# Patient Record
Sex: MALE | Race: BLACK OR AFRICAN AMERICAN | NOT HISPANIC OR LATINO | RURAL
[De-identification: names, ages, dates, MRNs, and addresses within clinical notes are randomized per-mention and may not be internally consistent; named-entity substitution may affect disease eponyms.]

---

## 2023-06-09 DIAGNOSIS — I63.9 CEREBRAL INFARCTION: Primary | ICD-10-CM

## 2023-08-11 ENCOUNTER — OFFICE VISIT (OUTPATIENT)
Dept: NEUROLOGY | Facility: CLINIC | Age: 68
End: 2023-08-11
Payer: MEDICARE

## 2023-08-11 VITALS
HEIGHT: 69 IN | DIASTOLIC BLOOD PRESSURE: 70 MMHG | HEART RATE: 71 BPM | WEIGHT: 170 LBS | OXYGEN SATURATION: 98 % | BODY MASS INDEX: 25.18 KG/M2 | RESPIRATION RATE: 18 BRPM | SYSTOLIC BLOOD PRESSURE: 116 MMHG

## 2023-08-11 DIAGNOSIS — G45.9 TIA (TRANSIENT ISCHEMIC ATTACK): Primary | ICD-10-CM

## 2023-08-11 DIAGNOSIS — I63.9 CEREBRAL INFARCTION: ICD-10-CM

## 2023-08-11 PROCEDURE — 99204 PR OFFICE/OUTPT VISIT, NEW, LEVL IV, 45-59 MIN: ICD-10-PCS | Mod: S$PBB,,, | Performed by: PSYCHIATRY & NEUROLOGY

## 2023-08-11 PROCEDURE — 99215 OFFICE O/P EST HI 40 MIN: CPT | Mod: PBBFAC | Performed by: PSYCHIATRY & NEUROLOGY

## 2023-08-11 PROCEDURE — 99204 OFFICE O/P NEW MOD 45 MIN: CPT | Mod: S$PBB,,, | Performed by: PSYCHIATRY & NEUROLOGY

## 2023-08-11 NOTE — PROGRESS NOTES
"    Subjective:       Patient ID: Kirk Smith is a 68 y.o. male     Chief Complaint:    Chief Complaint   Patient presents with    Stroke     Pt states he had a mini stroke        Allergies:  Patient has no known allergies.    Current Medications:    Outpatient Encounter Medications as of 8/11/2023   Medication Sig Dispense Refill    acetaminophen-codeine 300-30mg (TYLENOL #3) 300-30 mg Tab Take 1 tablet by mouth every 12 (twelve) hours as needed.      aspirin (ECOTRIN) 325 MG EC tablet TAKE 1 TABLET BY MOUTH EVERY DAY AFTER MEALS      clopidogreL (PLAVIX) 75 mg tablet Take by mouth.      rosuvastatin (CRESTOR) 20 MG tablet Take 20 mg by mouth every evening.      valsartan (DIOVAN) 160 MG tablet Take 160 mg by mouth every evening.       No facility-administered encounter medications on file as of 8/11/2023.       History of Present Illness  68-year-old black male referred to clinic for evaluation possible previous stroke/TRANSIENT ISCHEMIC ATTACK -I have no outside records available for review and no imaging or MRI to verify an acute cerebral infarct.  Patient is currently on aspirin Plavix and statin which is proper prevention for stroke.  Patient states he was told at [facility] that he had a "mini-stroke" I am assuming this meant a transient ischemic attack?   Pt states had symptoms 3 months ago of RLE weakness and dragging - he got no PT/Rehab after d/c from [facility] - unsure why not?  He is now rel resolved but gets LE weakness   Pt has HTN, XOL, still smoking tobacco - unsure if seeing a PCP ? [facility]ely [facility]          Past Medical History:   Diagnosis Date    Stroke        History reviewed. No pertinent surgical history.    Social History  Mr. Smith  reports that he has been smoking cigarettes. He has never used smokeless tobacco. He reports that he does not currently use alcohol. He reports that he does not use drugs.    Family History  Mr.'s Smith family history is not on file.    Review of " "Systems  Review of Systems   Neurological:  Positive for focal weakness.   All other systems reviewed and are negative.     Objective:   /70 (BP Location: Left arm, Patient Position: Sitting, BP Method: Large (Manual))   Pulse 71   Resp 18   Ht 5' 9" (1.753 m)   Wt 77.1 kg (170 lb)   SpO2 98%   BMI 25.10 kg/m²    NEUROLOGICAL EXAMINATION:     MENTAL STATUS   Oriented to person, place, and time.   Level of consciousness: alert  Knowledge: consistent with education.     CRANIAL NERVES   Cranial nerves II through XII intact.     MOTOR EXAM     Strength   Strength 5/5 throughout.        Functional weakness in LE's     GAIT AND COORDINATION     Gait  Gait: normal       Physical Exam  Vitals reviewed.   Constitutional:       Appearance: He is normal weight.   Neurological:      General: No focal deficit present.      Mental Status: He is alert and oriented to person, place, and time. Mental status is at baseline.      Cranial Nerves: Cranial nerves 2-12 are intact.      Motor: Motor strength is normal.     Gait: Gait is intact.          Assessment:     TIA (transient ischemic attack)         Primary Diagnosis and ICD10  TIA (transient ischemic attack) [G45.9]    Plan:     Patient Instructions   Continue aspirin Plavix and statin  Control all risk factors for stroke and MI  Healthy lifestyle habits  STOP smoking tobacco   Referral PT/Rehab - s/p stroke reconditioning  Follow-up 6 months     There are no discontinued medications.    Requested Prescriptions      No prescriptions requested or ordered in this encounter       "

## 2023-08-11 NOTE — PATIENT INSTRUCTIONS
Continue aspirin Plavix and statin  Control all risk factors for stroke and MI  Healthy lifestyle habits  STOP smoking tobacco   Referral PT/Rehab - s/p stroke reconditioning  Follow-up 6 months

## 2024-02-16 ENCOUNTER — OFFICE VISIT (OUTPATIENT)
Dept: NEUROLOGY | Facility: CLINIC | Age: 69
End: 2024-02-16
Payer: COMMERCIAL

## 2024-02-16 VITALS
WEIGHT: 170 LBS | OXYGEN SATURATION: 98 % | BODY MASS INDEX: 25.18 KG/M2 | RESPIRATION RATE: 18 BRPM | DIASTOLIC BLOOD PRESSURE: 98 MMHG | HEART RATE: 67 BPM | HEIGHT: 69 IN | SYSTOLIC BLOOD PRESSURE: 190 MMHG

## 2024-02-16 DIAGNOSIS — G45.9 TIA (TRANSIENT ISCHEMIC ATTACK): Primary | ICD-10-CM

## 2024-02-16 PROCEDURE — 99214 OFFICE O/P EST MOD 30 MIN: CPT | Mod: PBBFAC | Performed by: PSYCHIATRY & NEUROLOGY

## 2024-02-16 PROCEDURE — 99214 OFFICE O/P EST MOD 30 MIN: CPT | Mod: S$PBB,,, | Performed by: PSYCHIATRY & NEUROLOGY

## 2024-02-16 RX ORDER — VALSARTAN 160 MG/1
160 TABLET ORAL NIGHTLY
Qty: 90 TABLET | Refills: 3 | Status: SHIPPED | OUTPATIENT
Start: 2024-02-16

## 2024-02-16 RX ORDER — ASPIRIN 325 MG
TABLET, DELAYED RELEASE (ENTERIC COATED) ORAL
Qty: 90 TABLET | Refills: 3 | Status: SHIPPED | OUTPATIENT
Start: 2024-02-16

## 2024-02-16 NOTE — PROGRESS NOTES
"    Subjective:       Patient ID: Kirk Smith Jr. is a 68 y.o. male     Chief Complaint:    Chief Complaint   Patient presents with    Transient Ischemic Attack     Pt. States everything going great.        Allergies:  Patient has no known allergies.    Current Medications:    Outpatient Encounter Medications as of 2/16/2024   Medication Sig Dispense Refill    acetaminophen-codeine 300-30mg (TYLENOL #3) 300-30 mg Tab Take 1 tablet by mouth every 12 (twelve) hours as needed.      aspirin (ECOTRIN) 325 MG EC tablet TAKE 1 TABLET BY MOUTH EVERY DAY AFTER MEALS      clopidogreL (PLAVIX) 75 mg tablet Take by mouth.      rosuvastatin (CRESTOR) 20 MG tablet Take 20 mg by mouth every evening.      valsartan (DIOVAN) 160 MG tablet Take 160 mg by mouth every evening.       No facility-administered encounter medications on file as of 2/16/2024.       History of Present Illness  67 yo BM s/p TRANSIENT ISCHEMIC ATTACK 6 months ago - unsure of compliance ? He states not sure if he has run out of meds or not? Thus likely he has.  Instructed to cont ASPIRIN daily but may stop Plavix- BP out of control today but he is not taking his meds and still smoking heavily           Past Medical History:   Diagnosis Date    Stroke        History reviewed. No pertinent surgical history.    Social History  Mr. Smith  reports that he has been smoking cigarettes. He has never used smokeless tobacco. He reports that he does not currently use alcohol. He reports that he does not use drugs.    Family History  Mr.'s Smith family history is not on file.    Review of Systems  Review of Systems   All other systems reviewed and are negative.     Objective:   BP (!) 190/98 (BP Location: Right arm, Patient Position: Sitting, BP Method: Large (Manual))   Pulse 67   Resp 18   Ht 5' 9" (1.753 m)   Wt 77.1 kg (170 lb)   SpO2 98%   BMI 25.10 kg/m²    NEUROLOGICAL EXAMINATION:     MENTAL STATUS   Oriented to person, place, and time.   Level of " consciousness: drowsy  Knowledge: consistent with education.     CRANIAL NERVES   Cranial nerves II through XII intact.     MOTOR EXAM     Strength   Strength 5/5 throughout.     GAIT AND COORDINATION     Gait  Gait: normal       Physical Exam  Vitals reviewed.   Constitutional:       Appearance: He is normal weight.   Neurological:      General: No focal deficit present.      Mental Status: He is alert and oriented to person, place, and time. Mental status is at baseline.      Cranial Nerves: Cranial nerves 2-12 are intact.      Motor: Motor strength is normal.     Gait: Gait is intact.          Assessment:     TIA (transient ischemic attack)         Primary Diagnosis and ICD10  TIA (transient ischemic attack) [G45.9]    Plan:     There are no Patient Instructions on file for this visit.    There are no discontinued medications.    Requested Prescriptions      No prescriptions requested or ordered in this encounter

## 2024-02-16 NOTE — PATIENT INSTRUCTIONS
Stop smoking tobacco   F/u PCP regularly to control BP  Take ASPIRIN every day  May stop Plavix if have not already done so?  F/u prn